# Patient Record
Sex: MALE | Race: WHITE | Employment: FULL TIME | URBAN - METROPOLITAN AREA
[De-identification: names, ages, dates, MRNs, and addresses within clinical notes are randomized per-mention and may not be internally consistent; named-entity substitution may affect disease eponyms.]

---

## 2021-06-29 ENCOUNTER — VIRTUAL VISIT (OUTPATIENT)
Dept: FAMILY MEDICINE CLINIC | Age: 31
End: 2021-06-29

## 2021-06-30 ENCOUNTER — TELEPHONE (OUTPATIENT)
Dept: FAMILY MEDICINE CLINIC | Age: 31
End: 2021-06-30

## 2021-06-30 NOTE — TELEPHONE ENCOUNTER
----- Message from Usha Hubbard sent at 6/30/2021  2:20 PM EDT -----  Regarding: /Telephone  Appointment not available    Caller's first and last name and relationship to patient (if not the patient):Mother Hannah      Best contact number:[phone]      Preferred date and time:as soon as possible      Scheduled appointment date and [phone] at 11am      Reason for appointment:CPE and Ear issue      Details to clarify the request:n/a      Usha Hubbard

## 2021-06-30 NOTE — TELEPHONE ENCOUNTER
----- Message from Pipe Pickard MD sent at 6/29/2021  3:02 PM EDT -----  Regarding: Schedule in-clinic encounter to establish care  Please call pt to schedule in-office visit to establish care and R ear discomfort as unable to have Video-visit. Patient aware. Pt prefers this Thursday 7/1/21.  Thanks     Pipe Pickard MD

## 2021-07-27 ENCOUNTER — OFFICE VISIT (OUTPATIENT)
Dept: FAMILY MEDICINE CLINIC | Age: 31
End: 2021-07-27
Payer: COMMERCIAL

## 2021-07-27 VITALS
HEIGHT: 66 IN | OXYGEN SATURATION: 96 % | HEART RATE: 78 BPM | TEMPERATURE: 98 F | SYSTOLIC BLOOD PRESSURE: 97 MMHG | WEIGHT: 142.6 LBS | DIASTOLIC BLOOD PRESSURE: 67 MMHG | BODY MASS INDEX: 22.92 KG/M2 | RESPIRATION RATE: 16 BRPM

## 2021-07-27 DIAGNOSIS — Z23 ENCOUNTER FOR IMMUNIZATION: ICD-10-CM

## 2021-07-27 DIAGNOSIS — F84.0 AUTISM: ICD-10-CM

## 2021-07-27 DIAGNOSIS — Z00.00 VISIT FOR WELL MAN HEALTH CHECK: ICD-10-CM

## 2021-07-27 DIAGNOSIS — Z76.89 ENCOUNTER TO ESTABLISH CARE: ICD-10-CM

## 2021-07-27 DIAGNOSIS — H92.02 LEFT EAR PAIN: Primary | ICD-10-CM

## 2021-07-27 PROCEDURE — 69210 REMOVE IMPACTED EAR WAX UNI: CPT | Performed by: STUDENT IN AN ORGANIZED HEALTH CARE EDUCATION/TRAINING PROGRAM

## 2021-07-27 PROCEDURE — 90715 TDAP VACCINE 7 YRS/> IM: CPT | Performed by: FAMILY MEDICINE

## 2021-07-27 PROCEDURE — 90471 IMMUNIZATION ADMIN: CPT | Performed by: FAMILY MEDICINE

## 2021-07-27 PROCEDURE — 99203 OFFICE O/P NEW LOW 30 MIN: CPT | Performed by: STUDENT IN AN ORGANIZED HEALTH CARE EDUCATION/TRAINING PROGRAM

## 2021-07-27 PROCEDURE — 90715 TDAP VACCINE 7 YRS/> IM: CPT | Performed by: STUDENT IN AN ORGANIZED HEALTH CARE EDUCATION/TRAINING PROGRAM

## 2021-07-27 PROCEDURE — G0463 HOSPITAL OUTPT CLINIC VISIT: HCPCS | Performed by: STUDENT IN AN ORGANIZED HEALTH CARE EDUCATION/TRAINING PROGRAM

## 2021-07-27 NOTE — PROGRESS NOTES
[address] [facility] [address] [place] [address]   Office [phone], Fax [phone]      Chief Complaint:     Chief Complaint   Patient presents with   [facility].     Patient presents to establish care/physical form for special [employer]; also complaining of pain in Left ear. Terrell Ross is a 31 y.o. male that presents for: CPE and Left ear      Assessment/Plan:   I personally reviewed the following Pertinent Labs/Studies:   - NA    Diagnoses and all orders for this visit:    1. Left ear pain  Assessment & Plan:  2/2 cerumen impaction  Cerumen wax disimpacted in clinic  No signs of infection  Pain resolved    Orders:  -     REMOVE IMPACTED EAR WAX    2. Autism  Assessment & Plan:  No meds  See therapist      3. Encounter for immunization  -     TETANUS, DIPHTHERIA TOXOIDS AND ACELLULAR PERTUSSIS VACCINE (TDAP), IN INDIVIDS. >=7, IM  -     MA IMMUNIZ ADMIN,1 SINGLE/COMB VAC/TOXOID    4. Encounter to establish care    5. Visit for [facility] health check  - Labs deferred until later, no concerns currently minus Left ear pain from cerumen impaction      Follow up: Follow-up and Dispositions    · Return if symptoms worsen or fail to improve. Subjective:   HPI:  Terrell Ross is a 31 y.o. male that presents for:    Establish Care and Complete Physical:  - Previous doctor, Dr. Kathie Cervantes, stopped taking medicaid  - Needs Complete Physical for special [employer]  - No concerns  - Not sexually active, no drug use, etoh, or smoking  - Declined labs today    Right Ear Pain:  Cleaning ears with Q-tip over a month ago, felt like ruptured membrane and had pain at that time but now has resolved. Denies drainage or blood from ear. Autism:  - No specialists or medications      Health Maintenance: There are no preventive care reminders to display for this patient. ROS:   Review of Systems   Constitutional: Negative for chills, diaphoresis and fever.    HENT: Negative for congestion, ear discharge, ear pain, hearing loss, sinus pain, sore throat and tinnitus. Eyes: Negative for blurred vision. Respiratory: Negative for cough and shortness of breath. Cardiovascular: Negative for chest pain, palpitations, orthopnea and leg swelling. Gastrointestinal: Negative for abdominal pain, constipation, diarrhea, heartburn, nausea and vomiting. Genitourinary: Negative for dysuria and frequency. Neurological: Negative for dizziness and headaches. Endo/Heme/Allergies: Negative for polydipsia. Past medical history, social history, and medications personally reviewed. Past Medical History:   Diagnosis Date    Autism     Developmental delay         Allergies personally reviewed. Allergies   Allergen Reactions    Amoxicillin Other (comments)     Facial flushing           Objective:   Vitals reviewed. Visit Vitals  BP 97/67 (BP 1 Location: Left upper arm, BP Patient Position: Sitting, BP Cuff Size: Adult)   Pulse 78   Temp 98 °F (36.7 °C) (Oral)   Resp 16   Ht 5' 6.25\" (1.683 m)   Wt 142 lb 9.6 oz (64.7 kg)   SpO2 96%   BMI 22.84 kg/m²        Physical Exam    Vitals Reviewed. General AO x 3. No distress. Not diaphoretic. No jaundice. No cyanosis. No pallor. HENT No thyromegaly present. No JVD. No cervical adenopathy. Cardio Normal rate, regular rhythm. No murmur, rubs, or gallop. Pulmonary Effort normal. No accessory muscle use. No wheezes, rales, or rhonchi. Abdominal Soft. Bowel sounds normal. No tenderness. No distension. Extremities No edema of lower extremities. Pulses 2+. Neurological CN II-XII grossly intact. No focal deficits. Skin No rash. Pt was discussed with Dr Ted Martinez (attending physician). I have reviewed pertinent labs results and other data. I have discussed the diagnosis with the patient and the intended plan as seen in the above orders. The patient has received an after-visit summary and questions were answered concerning future plans.  I have discussed medication side effects and warnings with the patient as well.     Aston Chao MD  Resident [facility]  08/01/21

## 2021-07-27 NOTE — PROGRESS NOTES
Chief Complaint   Patient presents with   [facility].     Patient presents to establish care/physical form for special [employer]; also complaining of pain in Left ear. Visit Vitals  BP 97/67 (BP 1 Location: Left upper arm, BP Patient Position: Sitting, BP Cuff Size: Adult)   Pulse 78   Temp 98 °F (36.7 °C) (Oral)   Resp 16   Ht 5' 6.25\" (1.683 m)   Wt 142 lb 9.6 oz (64.7 kg)   SpO2 96%   BMI 22.84 kg/m²      1. Have you been to the ER, urgent care clinic since your last visit? Hospitalized since your last visit? No     2. Have you seen or consulted any other health care providers outside of the [address] [place] [address] since your last visit? Include any pap smears or colon screening.  No

## 2021-08-01 PROBLEM — H92.01 RIGHT EAR PAIN: Status: ACTIVE | Noted: 2021-08-01

## 2021-08-01 PROBLEM — H92.02 LEFT EAR PAIN: Status: ACTIVE | Noted: 2021-08-01

## 2022-03-19 PROBLEM — H92.02 LEFT EAR PAIN: Status: ACTIVE | Noted: 2021-08-01

## 2022-03-19 PROBLEM — F84.0 AUTISM: Status: ACTIVE | Noted: 2021-07-27
